# Patient Record
Sex: MALE | Race: WHITE | NOT HISPANIC OR LATINO | Employment: OTHER | URBAN - METROPOLITAN AREA
[De-identification: names, ages, dates, MRNs, and addresses within clinical notes are randomized per-mention and may not be internally consistent; named-entity substitution may affect disease eponyms.]

---

## 2018-11-30 PROBLEM — I10 ESSENTIAL HYPERTENSION: Status: ACTIVE | Noted: 2018-11-30

## 2018-11-30 PROBLEM — I25.810 CORONARY ARTERY DISEASE INVOLVING CORONARY BYPASS GRAFT OF NATIVE HEART WITHOUT ANGINA PECTORIS: Status: ACTIVE | Noted: 2018-11-30

## 2018-11-30 PROBLEM — E78.2 MIXED HYPERLIPIDEMIA: Status: ACTIVE | Noted: 2018-11-30

## 2018-11-30 PROBLEM — C61 PROSTATE CANCER: Status: ACTIVE | Noted: 2018-11-30

## 2018-11-30 PROBLEM — E03.4 HYPOTHYROIDISM DUE TO ACQUIRED ATROPHY OF THYROID: Status: ACTIVE | Noted: 2018-11-30

## 2019-01-15 PROBLEM — M19.90 OSTEOARTHRITIS: Status: ACTIVE | Noted: 2019-01-15

## 2019-11-29 PROBLEM — F51.01 PRIMARY INSOMNIA: Status: ACTIVE | Noted: 2019-11-29

## 2021-03-05 PROBLEM — Z89.421 ACQUIRED ABSENCE OF OTHER RIGHT TOE(S): Status: ACTIVE | Noted: 2021-03-05

## 2021-03-22 ENCOUNTER — LAB VISIT (OUTPATIENT)
Dept: LAB | Facility: HOSPITAL | Age: 86
End: 2021-03-22
Attending: FAMILY MEDICINE
Payer: MEDICARE

## 2021-03-22 DIAGNOSIS — I10 ESSENTIAL HYPERTENSION, MALIGNANT: Primary | ICD-10-CM

## 2021-03-22 PROCEDURE — 84439 ASSAY OF FREE THYROXINE: CPT | Mod: 91 | Performed by: FAMILY MEDICINE

## 2021-03-22 PROCEDURE — 84443 ASSAY THYROID STIM HORMONE: CPT | Performed by: FAMILY MEDICINE

## 2021-03-22 PROCEDURE — 80061 LIPID PANEL: CPT | Performed by: FAMILY MEDICINE

## 2021-03-22 PROCEDURE — 85025 COMPLETE CBC W/AUTO DIFF WBC: CPT | Performed by: FAMILY MEDICINE

## 2021-03-22 PROCEDURE — 80053 COMPREHEN METABOLIC PANEL: CPT | Performed by: FAMILY MEDICINE

## 2021-03-23 LAB
ALBUMIN SERPL BCP-MCNC: 3.6 G/DL (ref 3.5–5.2)
ALP SERPL-CCNC: 74 U/L (ref 55–135)
ALT SERPL W/O P-5'-P-CCNC: 13 U/L (ref 10–44)
ANION GAP SERPL CALC-SCNC: 9 MMOL/L (ref 8–16)
AST SERPL-CCNC: 24 U/L (ref 10–40)
BASOPHILS # BLD AUTO: 0.02 K/UL (ref 0–0.2)
BASOPHILS NFR BLD: 0.3 % (ref 0–1.9)
BILIRUB SERPL-MCNC: 0.3 MG/DL (ref 0.1–1)
BUN SERPL-MCNC: 28 MG/DL (ref 8–23)
CALCIUM SERPL-MCNC: 8.6 MG/DL (ref 8.7–10.5)
CHLORIDE SERPL-SCNC: 102 MMOL/L (ref 95–110)
CHOLEST SERPL-MCNC: 149 MG/DL (ref 120–199)
CHOLEST/HDLC SERPL: 3.2 {RATIO} (ref 2–5)
CO2 SERPL-SCNC: 25 MMOL/L (ref 23–29)
CREAT SERPL-MCNC: 1.1 MG/DL (ref 0.5–1.4)
DIFFERENTIAL METHOD: ABNORMAL
EOSINOPHIL # BLD AUTO: 0.4 K/UL (ref 0–0.5)
EOSINOPHIL NFR BLD: 6.5 % (ref 0–8)
ERYTHROCYTE [DISTWIDTH] IN BLOOD BY AUTOMATED COUNT: 12.5 % (ref 11.5–14.5)
EST. GFR  (AFRICAN AMERICAN): >60 ML/MIN/1.73 M^2
EST. GFR  (NON AFRICAN AMERICAN): 58.8 ML/MIN/1.73 M^2
GLUCOSE SERPL-MCNC: 136 MG/DL (ref 70–110)
HCT VFR BLD AUTO: 36.8 % (ref 40–54)
HDLC SERPL-MCNC: 46 MG/DL (ref 40–75)
HDLC SERPL: 30.9 % (ref 20–50)
HGB BLD-MCNC: 12 G/DL (ref 14–18)
IMM GRANULOCYTES # BLD AUTO: 0.01 K/UL (ref 0–0.04)
IMM GRANULOCYTES NFR BLD AUTO: 0.2 % (ref 0–0.5)
LDLC SERPL CALC-MCNC: 78 MG/DL (ref 63–159)
LYMPHOCYTES # BLD AUTO: 1.3 K/UL (ref 1–4.8)
LYMPHOCYTES NFR BLD: 21 % (ref 18–48)
MCH RBC QN AUTO: 33.7 PG (ref 27–31)
MCHC RBC AUTO-ENTMCNC: 32.6 G/DL (ref 32–36)
MCV RBC AUTO: 103 FL (ref 82–98)
MONOCYTES # BLD AUTO: 0.6 K/UL (ref 0.3–1)
MONOCYTES NFR BLD: 9.2 % (ref 4–15)
NEUTROPHILS # BLD AUTO: 4 K/UL (ref 1.8–7.7)
NEUTROPHILS NFR BLD: 62.8 % (ref 38–73)
NONHDLC SERPL-MCNC: 103 MG/DL
NRBC BLD-RTO: 0 /100 WBC
PLATELET # BLD AUTO: 220 K/UL (ref 150–350)
PMV BLD AUTO: 10.6 FL (ref 9.2–12.9)
POTASSIUM SERPL-SCNC: 4.5 MMOL/L (ref 3.5–5.1)
PROT SERPL-MCNC: 6.5 G/DL (ref 6–8.4)
RBC # BLD AUTO: 3.56 M/UL (ref 4.6–6.2)
SODIUM SERPL-SCNC: 136 MMOL/L (ref 136–145)
T4 FREE SERPL-MCNC: 1.21 NG/DL (ref 0.71–1.51)
T4 FREE SERPL-MCNC: 1.23 NG/DL (ref 0.71–1.51)
TRIGL SERPL-MCNC: 125 MG/DL (ref 30–150)
TSH SERPL DL<=0.005 MIU/L-ACNC: 10.26 UIU/ML (ref 0.4–4)
WBC # BLD AUTO: 6.3 K/UL (ref 3.9–12.7)

## 2021-10-19 PROBLEM — Z87.442 HISTORY OF NEPHROLITHIASIS: Status: ACTIVE | Noted: 2021-10-19

## 2021-11-11 DIAGNOSIS — H91.90 HEARING DIFFICULTY, UNSPECIFIED LATERALITY: Primary | ICD-10-CM

## 2022-01-28 ENCOUNTER — OFFICE VISIT (OUTPATIENT)
Dept: OTOLARYNGOLOGY | Facility: CLINIC | Age: 87
End: 2022-01-28
Payer: MEDICARE

## 2022-01-28 VITALS — BODY MASS INDEX: 27.63 KG/M2 | HEIGHT: 63 IN

## 2022-01-28 DIAGNOSIS — H61.22 IMPACTED CERUMEN, LEFT EAR: ICD-10-CM

## 2022-01-28 DIAGNOSIS — H91.93 BILATERAL HEARING LOSS, UNSPECIFIED HEARING LOSS TYPE: Primary | ICD-10-CM

## 2022-01-28 DIAGNOSIS — Z97.4 WEARS HEARING AID IN BOTH EARS: ICD-10-CM

## 2022-01-28 PROCEDURE — 99203 PR OFFICE/OUTPT VISIT, NEW, LEVL III, 30-44 MIN: ICD-10-PCS | Mod: 25,S$PBB,, | Performed by: NURSE PRACTITIONER

## 2022-01-28 PROCEDURE — 99213 OFFICE O/P EST LOW 20 MIN: CPT | Mod: PBBFAC,PO,25 | Performed by: NURSE PRACTITIONER

## 2022-01-28 PROCEDURE — 69210 PR REMOVAL IMPACTED CERUMEN REQUIRING INSTRUMENTATION, UNILATERAL: ICD-10-PCS | Mod: S$PBB,,, | Performed by: NURSE PRACTITIONER

## 2022-01-28 PROCEDURE — 99999 PR PBB SHADOW E&M-EST. PATIENT-LVL III: CPT | Mod: PBBFAC,,, | Performed by: NURSE PRACTITIONER

## 2022-01-28 PROCEDURE — 69210 REMOVE IMPACTED EAR WAX UNI: CPT | Mod: S$PBB,,, | Performed by: NURSE PRACTITIONER

## 2022-01-28 PROCEDURE — 99999 PR PBB SHADOW E&M-EST. PATIENT-LVL III: ICD-10-PCS | Mod: PBBFAC,,, | Performed by: NURSE PRACTITIONER

## 2022-01-28 PROCEDURE — 69210 REMOVE IMPACTED EAR WAX UNI: CPT | Mod: PBBFAC,PO,LT | Performed by: NURSE PRACTITIONER

## 2022-01-28 PROCEDURE — 99203 OFFICE O/P NEW LOW 30 MIN: CPT | Mod: 25,S$PBB,, | Performed by: NURSE PRACTITIONER

## 2022-01-28 NOTE — PROGRESS NOTES
Subjective:       Patient ID: Enzo Velasquez is a 89 y.o. male.    Chief Complaint: Cerumen Impaction    HPI   Patient is new to ENT. Patient reports past h/o recurrent cerumen impactions. He purchased new hearing aids but was suggested to have cerumen impactions addressed. Reports recent attempt at cerumen impaction removal caused otalgia and otorrhagia.    Review of Systems   Constitutional: Negative.    HENT: Positive for ear discharge, ear pain and hearing loss.    Eyes: Negative.    Respiratory: Negative.    Cardiovascular: Negative.    Gastrointestinal: Negative.    Musculoskeletal: Negative.    Integumentary:  Negative.   Neurological: Negative.    Hematological: Negative.    Psychiatric/Behavioral: Negative.          Objective:      Physical Exam  Vitals and nursing note reviewed.   Constitutional:       General: He is not in acute distress.Vital signs are normal.      Appearance: He is well-developed and well-nourished. He is not ill-appearing or diaphoretic.   HENT:      Head: Normocephalic and atraumatic.      Right Ear: Hearing, tympanic membrane, ear canal and external ear normal. No middle ear effusion. Tympanic membrane is not erythematous.      Left Ear: Hearing, tympanic membrane, ear canal and external ear normal.  No middle ear effusion. Tympanic membrane is not erythematous.      Ears:        Nose: Nose normal.      Mouth/Throat:      Mouth: Oropharynx is clear and moist and mucous membranes are normal.   Eyes:      General: Lids are normal. No scleral icterus.        Right eye: No discharge.         Left eye: No discharge.      Extraocular Movements: EOM normal.   Neck:      Trachea: Trachea normal. No tracheal deviation.   Cardiovascular:      Rate and Rhythm: Normal rate.   Pulmonary:      Effort: Pulmonary effort is normal. No respiratory distress.      Breath sounds: No stridor. No wheezing.   Musculoskeletal:         General: Normal range of motion.      Cervical back: Normal range of  motion and neck supple.      Comments: Did not attempt to move pt from his wheelchair for duration of visit   Skin:     General: Skin is warm, dry and intact.      Coloration: Skin is not pale.      Nails: There is no cyanosis.   Neurological:      Mental Status: He is alert and oriented to person, place, and time.      Coordination: Coordination normal.      Gait: Gait normal.      Deep Tendon Reflexes: Strength normal.   Psychiatric:         Mood and Affect: Mood and affect normal.         Speech: Speech normal.         Behavior: Behavior normal. Behavior is cooperative.         Thought Content: Thought content normal.         Cognition and Memory: Cognition and memory normal.         Judgment: Judgment normal.       SEPARATE PROCEDURE IN OFFICE:   Procedure: Removal of impacted cerumen, LEFT   Pre Procedure Diagnosis: Cerumen Impaction   Post Procedure Diagnosis: Cerumen Impaction   Verbal informed consent in regards to risk of trauma to ear canal, ear drum or hearing, discomfort during procedure and/or inability to remove cerumen impaction in one session or unforeseen events or complications.   No anesthesia.     Procedure in detail:   Ear canal visualized bilateral with appropriate size ear speculum utilizing Operating Head Binocular Otomicroscope   Utilizing the following:  Ring curet used. The impacted cerumen of the ear canals was removed atraumatically. The TM and EAC were then inspected and found to be clear of wax. See description of TMs/EACs in PE above.   Complications: No   Condition: Improved/Good    Assessment:       Problem List Items Addressed This Visit    None     Visit Diagnoses     Bilateral hearing loss, unspecified hearing loss type    -  Primary    Impacted cerumen, left ear        Wears hearing aid in both ears              Plan:     Debrox prn    Peroxide AD if bothersome  Patient encouraged to return to clinic if symptoms worsen/persist and as needed for further ENT symptoms or concerns.

## 2022-03-14 ENCOUNTER — DOCUMENT SCAN (OUTPATIENT)
Dept: HOME HEALTH SERVICES | Facility: HOSPITAL | Age: 87
End: 2022-03-14
Payer: MEDICARE

## 2022-03-15 ENCOUNTER — PATIENT OUTREACH (OUTPATIENT)
Dept: HOME HEALTH SERVICES | Facility: HOSPITAL | Age: 87
End: 2022-03-15
Payer: MEDICARE

## 2022-03-15 ENCOUNTER — EXTERNAL HOME HEALTH (OUTPATIENT)
Dept: HOME HEALTH SERVICES | Facility: HOSPITAL | Age: 87
End: 2022-03-15
Payer: MEDICARE

## 2022-03-17 ENCOUNTER — DOCUMENT SCAN (OUTPATIENT)
Dept: HOME HEALTH SERVICES | Facility: HOSPITAL | Age: 87
End: 2022-03-17
Payer: MEDICARE

## 2022-03-23 ENCOUNTER — DOCUMENT SCAN (OUTPATIENT)
Dept: HOME HEALTH SERVICES | Facility: HOSPITAL | Age: 87
End: 2022-03-23
Payer: MEDICARE

## 2022-05-20 ENCOUNTER — DOCUMENT SCAN (OUTPATIENT)
Dept: HOME HEALTH SERVICES | Facility: HOSPITAL | Age: 87
End: 2022-05-20
Payer: MEDICARE

## 2022-11-10 PROBLEM — J18.9 PNEUMONIA: Status: ACTIVE | Noted: 2022-11-10

## 2022-11-10 PROBLEM — L89.109: Status: ACTIVE | Noted: 2022-11-10

## 2022-11-11 PROBLEM — Z75.8 DISCHARGE PLANNING ISSUES: Status: ACTIVE | Noted: 2022-11-11

## 2023-02-20 PROBLEM — J18.9 PNEUMONIA: Status: RESOLVED | Noted: 2022-11-10 | Resolved: 2023-02-20

## 2023-03-17 PROBLEM — Z71.89 ADVANCE CARE PLANNING: Status: ACTIVE | Noted: 2023-03-17

## 2023-03-17 PROBLEM — E86.0 DEHYDRATION: Status: ACTIVE | Noted: 2023-03-17

## 2023-03-17 PROBLEM — Z87.01 HISTORY OF PNEUMONIA: Status: ACTIVE | Noted: 2023-03-17

## 2023-03-18 PROBLEM — R33.9 URINARY RETENTION: Status: ACTIVE | Noted: 2023-03-18

## 2023-03-18 PROBLEM — R09.02 HYPOXIA: Status: ACTIVE | Noted: 2023-03-18

## 2023-03-24 PROBLEM — K62.5 RECTAL BLEEDING: Status: ACTIVE | Noted: 2023-03-24

## 2023-03-29 ENCOUNTER — OUTPATIENT CASE MANAGEMENT (OUTPATIENT)
Dept: ADMINISTRATIVE | Facility: OTHER | Age: 88
End: 2023-03-29
Payer: MEDICARE

## 2023-06-26 PROBLEM — K62.5 RECTAL BLEEDING: Status: RESOLVED | Noted: 2023-03-24 | Resolved: 2023-06-26
